# Patient Record
Sex: FEMALE | Race: WHITE
[De-identification: names, ages, dates, MRNs, and addresses within clinical notes are randomized per-mention and may not be internally consistent; named-entity substitution may affect disease eponyms.]

---

## 2017-07-03 NOTE — NUR
RN CALLED TO PT BS.  PT REQUESTS LINENS FOR SO FOR NIGHT PROVIDED.  PT ALSO
REQUESTS ADDITIONAL COKE, PROVIDED.  PT DENIES ANY FURTHER NEEDS AT THIS TIME.
 BED IN LOW POSITION, SIDE RAILS UP TIMES 2, CALL LIGHT AND PHONE IN REACH.
SO REMAINS AT PT BS FOR SUPPORT AND ASSISTANCE.  INFANT REMAINS AT PT BS FOR
COUPLET CARE.  WILL CONT TO NAILA PT STATUS.

## 2017-07-03 NOTE — NUR
RN TO PT BS FOR ROUNDS.  PT RESTING IN BED IN SEMI-FOWLERS POSITION, VISITING
WITH MULTIPLE FAMILY MEMBERS AT BS, IN NO ACUTE DISTRESS.  PT DENIES ANY NEEDS
AT THIS TIME.  BED IN LOW POSITION, SIDE RAILS UP TIMES 2, CALL LIGHT AND
PHONE IN REACH.  FAMILY REMAINS AT PT BS FOR SUPPORT AND ASSISTANCE.  INFANT
REMAINS AT PT BS FOR COUPLET CARE.  WILL CONT TO MONITOR PT STATUS.

## 2017-07-03 NOTE — NUR
RN TO PT BS FOR JOSUE.  PT RESTING IN BED IN SEMI-FOWLERS POSITION, HOLDING
INFANT.  PT IN NO ACUTE DISTRESS.  PT IS A 27YO G3 NOW  WITH  OF
VIABLE FEMALE INFANT TODAY @ 1259.  INFANT @ 40.5 WKS GESTATION.  PT WITH NO
LACERATION OR EPIS.  AAOX3.  HR REGULAR.  LUNGS CTAB.  ABDOMEN SOFT AND NON
TENDER.  BS ACTIVE TIMES 4.  FUNDUS FIRM AND ML @ U/-1.  LOCHIA RUBRA SCANT.
MERY PAD AND PANTIES IN PLACE.  PERINIUM APPEARS TO BE INTACT WITH MINIMAL
SWELLING NOTED.  PT DENIES DIFFICULTY VOIDING.  STATES SHE HAS PASSED GAS
SINCE  BUT HAS NOT HAD A BM.  NO SWELLING NOTED TO UPPER OR LOWER
EXTREMITIES BILATERALLY.  18 G SL IN PLACE TO RIGHT FA, FLUSHED AT THIS TIME
WITH 5CC NS WITHOUT DIFFICULTY.  NO REDNESS OR EDEMA NOTED TO SITE.  PT RATES
PAIN 2/10, DENIES THE NEED FOR PAIN MEDICATION AT THIS TIME.  BED IN LOW
POSITION, SIDE RAILS UP TIMES 2, CALL LIGHT AND PHONE IN REACH.  WILL CONT TO
MONITOR PT STATUS.  INFANT REMAINS AT PT BS FOR COUPLET CARE.

## 2017-07-04 NOTE — NUR
ASSESSMENT DONE. PT SITTING UP IN BED HOLDING INFANT. REG DIET AT BEDSIDE.
VERBAL RESPONSES APPRO TO QUESTIONS. BEAVER AT WILL. WHEN ASK - CO CRAMPING LOWER
ABD AND SOME LOWER BACK DISCOMFORT. RATES CRAMPING A 7 ON SCALE OF 0-10.
STATES WOULD LIKE SOMETHING FOR PAIN. FAMILY MEMBER AT BEDSIDE.

## 2017-07-04 NOTE — NUR
RN CALLED TO PT BS WITH REQUEST OF ABIEL COOMBS.  PROVIDED TO PT.  PT
RESTING IN BED IN SEMI-FOWLERS POSITION IN NO ACUTE DISTRESS.  PT DENIES ANY
FURTHER NEEDS AT THIS TIME.  SO REMAINS AT PT BS FOR SUPPORT AND ASSISTANCE.
INFANT REMAINS AT PT BS FOR COUPLET CARE.  WILL CONT TO MONITOR PT STATUS.

## 2017-07-04 NOTE — NUR
RN TO BEDSIDE FOR ROUNDS. PT RESTING IN SEMI FOWLERS POSITION ON RIGHT SIDE.
RESPIRATIONS REGULAR AND UNLABORED. NO S/S OF DISTRESS NOTED. BED IN LOW
POSITION WITH UPPER SIDE RAILS RAISED X2. CL AND PHONE WITHIN REACH. WILL CONT
TO MONITOR AND ASSIST PRN.

## 2017-07-04 NOTE — NUR
UP AND ABOUT IN ROOM. DENIES NEED. RATES PAIN 2 ON SCALE OF 0-10. STATES IPAIN
IS BETTER. COLA PROVIDED PER REQUEST.

## 2017-07-04 NOTE — NUR
SALINE LOCK REMOVED- CATH TIP INTACT. BANDAIDE APPLIED. COLA GIVEN PER
REQUEST. INFANT IN ROOM IN CRIB. PT DENIES NEEDS.

## 2017-07-04 NOTE — NUR
DISCHARGE INST VERBAL AND WRITTEN GIVEN. PRESCRIPTION FOR MOTRIN GIVEN ALONG
WITH DRUG INFO SHEETS. PT MED REC GIVEN. HEALTH SUMMARY GIVEN. PFW POST PARTUM
INST GIVEN. DISCHARGE INST FOR VAG DEL GIVEN. PT DENIES QUESTIONS.

## 2017-07-04 NOTE — NUR
RN TO BEDSIDE FOR ROUNDS. PT RESTING WITH EYES CLOSED IN SEMI FOWLERS
POSITION. RESPIRATIONS REGULAR AND UNLABORED. NO S/S OF DISTRESS NOTED. BED IN
LOW POSITION WITH UPPER SIDE RAILS RAISED X2. CL AND PHONE WITHIN PT REACH.
WILL CONT TO MONITOR AND ASSIST PRN.

## 2017-07-04 NOTE — NUR
RN TO PT BS FOR ROUNDS.  PT RESTING IN BED IN SEMI-FOWLERS POSITION, WITH EYES
CLOSED, IN NO ACUTE DISTRESS.  RESPIRATIONS EVEN AND UNLABORED.  BED IN LOW
POSITION, SIDE RAILS UP TIMES 2, CALL LIGHT AND PHONE IN REACH.  SO REMAINS AT
PT BS FOR SUPPORT AND ASSISTANCE.  WILL CONT TO MONITOR PT STATUS.

## 2018-06-21 ENCOUNTER — HOSPITAL ENCOUNTER (OUTPATIENT)
Dept: HOSPITAL 84 - D.MRI | Age: 28
Discharge: HOME | End: 2018-06-21
Attending: CLINICAL NURSE SPECIALIST
Payer: MEDICAID

## 2018-06-21 VITALS — BODY MASS INDEX: 27 KG/M2

## 2018-06-21 DIAGNOSIS — M79.642: Primary | ICD-10-CM
